# Patient Record
Sex: MALE | Race: WHITE | NOT HISPANIC OR LATINO | URBAN - METROPOLITAN AREA
[De-identification: names, ages, dates, MRNs, and addresses within clinical notes are randomized per-mention and may not be internally consistent; named-entity substitution may affect disease eponyms.]

---

## 2019-01-01 ENCOUNTER — EMERGENCY (EMERGENCY)
Facility: HOSPITAL | Age: 79
LOS: 1 days | End: 2019-01-01
Attending: EMERGENCY MEDICINE | Admitting: EMERGENCY MEDICINE
Payer: MEDICARE

## 2019-01-01 VITALS
DIASTOLIC BLOOD PRESSURE: 61 MMHG | OXYGEN SATURATION: 95 % | TEMPERATURE: 97 F | HEART RATE: 106 BPM | RESPIRATION RATE: 22 BRPM | SYSTOLIC BLOOD PRESSURE: 109 MMHG

## 2019-01-01 VITALS — SYSTOLIC BLOOD PRESSURE: 108 MMHG | HEART RATE: 88 BPM | OXYGEN SATURATION: 95 % | DIASTOLIC BLOOD PRESSURE: 70 MMHG

## 2019-01-01 DIAGNOSIS — I25.10 ATHEROSCLEROTIC HEART DISEASE OF NATIVE CORONARY ARTERY WITHOUT ANGINA PECTORIS: ICD-10-CM

## 2019-01-01 DIAGNOSIS — Z88.8 ALLERGY STATUS TO OTHER DRUGS, MEDICAMENTS AND BIOLOGICAL SUBSTANCES: ICD-10-CM

## 2019-01-01 DIAGNOSIS — I46.9 CARDIAC ARREST, CAUSE UNSPECIFIED: ICD-10-CM

## 2019-01-01 LAB
ALBUMIN SERPL ELPH-MCNC: 2.9 G/DL — LOW (ref 3.3–5)
ALBUMIN SERPL ELPH-MCNC: 3.1 G/DL — LOW (ref 3.3–5)
ALP SERPL-CCNC: 109 U/L — SIGNIFICANT CHANGE UP (ref 40–120)
ALP SERPL-CCNC: 128 U/L — HIGH (ref 40–120)
ALT FLD-CCNC: 37 U/L — SIGNIFICANT CHANGE UP (ref 10–45)
ALT FLD-CCNC: 43 U/L — SIGNIFICANT CHANGE UP (ref 10–45)
ANION GAP SERPL CALC-SCNC: 17 MMOL/L — SIGNIFICANT CHANGE UP (ref 5–17)
ANION GAP SERPL CALC-SCNC: 17 MMOL/L — SIGNIFICANT CHANGE UP (ref 5–17)
ANISOCYTOSIS BLD QL: SLIGHT — SIGNIFICANT CHANGE UP
APPEARANCE UR: CLEAR — SIGNIFICANT CHANGE UP
APTT BLD: 35.4 SEC — SIGNIFICANT CHANGE UP (ref 27.5–36.3)
AST SERPL-CCNC: 37 U/L — SIGNIFICANT CHANGE UP (ref 10–40)
AST SERPL-CCNC: 46 U/L — HIGH (ref 10–40)
BACTERIA # UR AUTO: PRESENT /HPF
BASE EXCESS BLDV CALC-SCNC: -2 MMOL/L — SIGNIFICANT CHANGE UP
BASE EXCESS BLDV CALC-SCNC: -2 MMOL/L — SIGNIFICANT CHANGE UP
BASE EXCESS BLDV CALC-SCNC: -6.9 MMOL/L — SIGNIFICANT CHANGE UP
BASOPHILS # BLD AUTO: 0.02 K/UL — SIGNIFICANT CHANGE UP (ref 0–0.2)
BASOPHILS # BLD AUTO: 0.08 K/UL — SIGNIFICANT CHANGE UP (ref 0–0.2)
BASOPHILS NFR BLD AUTO: 0.3 % — SIGNIFICANT CHANGE UP (ref 0–2)
BASOPHILS NFR BLD AUTO: 0.9 % — SIGNIFICANT CHANGE UP (ref 0–2)
BILIRUB SERPL-MCNC: 0.5 MG/DL — SIGNIFICANT CHANGE UP (ref 0.2–1.2)
BILIRUB SERPL-MCNC: 1 MG/DL — SIGNIFICANT CHANGE UP (ref 0.2–1.2)
BILIRUB UR-MCNC: NEGATIVE — SIGNIFICANT CHANGE UP
BLD GP AB SCN SERPL QL: NEGATIVE — SIGNIFICANT CHANGE UP
BUN SERPL-MCNC: 45 MG/DL — HIGH (ref 7–23)
BUN SERPL-MCNC: 47 MG/DL — HIGH (ref 7–23)
CA-I SERPL-SCNC: 1.11 MMOL/L — LOW (ref 1.12–1.3)
CALCIUM SERPL-MCNC: 8 MG/DL — LOW (ref 8.4–10.5)
CALCIUM SERPL-MCNC: 8.4 MG/DL — SIGNIFICANT CHANGE UP (ref 8.4–10.5)
CHLORIDE SERPL-SCNC: 103 MMOL/L — SIGNIFICANT CHANGE UP (ref 96–108)
CHLORIDE SERPL-SCNC: 99 MMOL/L — SIGNIFICANT CHANGE UP (ref 96–108)
CK MB CFR SERPL CALC: 12.7 NG/ML — HIGH (ref 0–6.7)
CK MB CFR SERPL CALC: 5.8 NG/ML — SIGNIFICANT CHANGE UP (ref 0–6.7)
CK SERPL-CCNC: 151 U/L — SIGNIFICANT CHANGE UP (ref 30–200)
CK SERPL-CCNC: 91 U/L — SIGNIFICANT CHANGE UP (ref 30–200)
CO2 SERPL-SCNC: 22 MMOL/L — SIGNIFICANT CHANGE UP (ref 22–31)
CO2 SERPL-SCNC: 27 MMOL/L — SIGNIFICANT CHANGE UP (ref 22–31)
COLOR SPEC: YELLOW — SIGNIFICANT CHANGE UP
CREAT SERPL-MCNC: 1.38 MG/DL — HIGH (ref 0.5–1.3)
CREAT SERPL-MCNC: 1.41 MG/DL — HIGH (ref 0.5–1.3)
DIFF PNL FLD: ABNORMAL
ELLIPTOCYTES BLD QL SMEAR: SLIGHT — SIGNIFICANT CHANGE UP
EOSINOPHIL # BLD AUTO: 0.02 K/UL — SIGNIFICANT CHANGE UP (ref 0–0.5)
EOSINOPHIL # BLD AUTO: 0.16 K/UL — SIGNIFICANT CHANGE UP (ref 0–0.5)
EOSINOPHIL NFR BLD AUTO: 0.3 % — SIGNIFICANT CHANGE UP (ref 0–6)
EOSINOPHIL NFR BLD AUTO: 1.7 % — SIGNIFICANT CHANGE UP (ref 0–6)
EPI CELLS # UR: SIGNIFICANT CHANGE UP /HPF (ref 0–5)
GAS PNL BLDV: 138 MMOL/L — SIGNIFICANT CHANGE UP (ref 138–146)
GAS PNL BLDV: SIGNIFICANT CHANGE UP
GIANT PLATELETS BLD QL SMEAR: PRESENT — SIGNIFICANT CHANGE UP
GLUCOSE SERPL-MCNC: 170 MG/DL — HIGH (ref 70–99)
GLUCOSE SERPL-MCNC: 195 MG/DL — HIGH (ref 70–99)
GLUCOSE UR QL: NEGATIVE — SIGNIFICANT CHANGE UP
HCO3 BLDV-SCNC: 23 MMOL/L — SIGNIFICANT CHANGE UP (ref 20–27)
HCO3 BLDV-SCNC: 24 MMOL/L — SIGNIFICANT CHANGE UP (ref 20–27)
HCO3 BLDV-SCNC: 25 MMOL/L — SIGNIFICANT CHANGE UP (ref 20–27)
HCT VFR BLD CALC: 27.4 % — LOW (ref 39–50)
HCT VFR BLD CALC: 33.8 % — LOW (ref 39–50)
HGB BLD-MCNC: 10.6 G/DL — LOW (ref 13–17)
HGB BLD-MCNC: 8.6 G/DL — LOW (ref 13–17)
HYALINE CASTS # UR AUTO: SIGNIFICANT CHANGE UP /LPF (ref 0–2)
IMM GRANULOCYTES NFR BLD AUTO: 1 % — SIGNIFICANT CHANGE UP (ref 0–1.5)
INR BLD: 2.19 — HIGH (ref 0.88–1.16)
KETONES UR-MCNC: NEGATIVE — SIGNIFICANT CHANGE UP
LACTATE SERPL-SCNC: 4.9 MMOL/L — CRITICAL HIGH (ref 0.5–2)
LACTATE SERPL-SCNC: 5.7 MMOL/L — CRITICAL HIGH (ref 0.5–2)
LEUKOCYTE ESTERASE UR-ACNC: NEGATIVE — SIGNIFICANT CHANGE UP
LYMPHOCYTES # BLD AUTO: 0.86 K/UL — LOW (ref 1–3.3)
LYMPHOCYTES # BLD AUTO: 10.8 % — LOW (ref 13–44)
LYMPHOCYTES # BLD AUTO: 4.33 K/UL — HIGH (ref 1–3.3)
LYMPHOCYTES # BLD AUTO: 46.1 % — HIGH (ref 13–44)
MACROCYTES BLD QL: SLIGHT — SIGNIFICANT CHANGE UP
MAGNESIUM SERPL-MCNC: 1.7 MG/DL — SIGNIFICANT CHANGE UP (ref 1.6–2.6)
MAGNESIUM SERPL-MCNC: 2 MG/DL — SIGNIFICANT CHANGE UP (ref 1.6–2.6)
MANUAL SMEAR VERIFICATION: SIGNIFICANT CHANGE UP
MCHC RBC-ENTMCNC: 30.1 PG — SIGNIFICANT CHANGE UP (ref 27–34)
MCHC RBC-ENTMCNC: 31.4 GM/DL — LOW (ref 32–36)
MCHC RBC-ENTMCNC: 31.4 GM/DL — LOW (ref 32–36)
MCHC RBC-ENTMCNC: 31.5 PG — SIGNIFICANT CHANGE UP (ref 27–34)
MCV RBC AUTO: 100.4 FL — HIGH (ref 80–100)
MCV RBC AUTO: 96 FL — SIGNIFICANT CHANGE UP (ref 80–100)
METAMYELOCYTES # FLD: 0.9 % — HIGH (ref 0–0)
MICROCYTES BLD QL: SLIGHT — SIGNIFICANT CHANGE UP
MONOCYTES # BLD AUTO: 0.31 K/UL — SIGNIFICANT CHANGE UP (ref 0–0.9)
MONOCYTES # BLD AUTO: 0.32 K/UL — SIGNIFICANT CHANGE UP (ref 0–0.9)
MONOCYTES NFR BLD AUTO: 3.4 % — SIGNIFICANT CHANGE UP (ref 2–14)
MONOCYTES NFR BLD AUTO: 3.9 % — SIGNIFICANT CHANGE UP (ref 2–14)
NEUTROPHILS # BLD AUTO: 4.41 K/UL — SIGNIFICANT CHANGE UP (ref 1.8–7.4)
NEUTROPHILS # BLD AUTO: 6.64 K/UL — SIGNIFICANT CHANGE UP (ref 1.8–7.4)
NEUTROPHILS NFR BLD AUTO: 41.9 % — LOW (ref 43–77)
NEUTROPHILS NFR BLD AUTO: 83.7 % — HIGH (ref 43–77)
NEUTS BAND # BLD: 5.1 % — SIGNIFICANT CHANGE UP (ref 0–8)
NITRITE UR-MCNC: NEGATIVE — SIGNIFICANT CHANGE UP
NRBC # BLD: 0 /100 WBCS — SIGNIFICANT CHANGE UP (ref 0–0)
OVALOCYTES BLD QL SMEAR: SLIGHT — SIGNIFICANT CHANGE UP
PCO2 BLDV: 50 MMHG — SIGNIFICANT CHANGE UP (ref 41–51)
PCO2 BLDV: 50 MMHG — SIGNIFICANT CHANGE UP (ref 41–51)
PCO2 BLDV: 68 MMHG — HIGH (ref 41–51)
PH BLDV: 7.14 — CRITICAL LOW (ref 7.32–7.43)
PH BLDV: 7.31 — LOW (ref 7.32–7.43)
PH BLDV: 7.31 — LOW (ref 7.32–7.43)
PH UR: 5.5 — SIGNIFICANT CHANGE UP (ref 5–8)
PHOSPHATE SERPL-MCNC: 5.2 MG/DL — HIGH (ref 2.5–4.5)
PLAT MORPH BLD: ABNORMAL
PLATELET # BLD AUTO: 106 K/UL — LOW (ref 150–400)
PLATELET # BLD AUTO: 126 K/UL — LOW (ref 150–400)
PO2 BLDV: 35 MMHG — SIGNIFICANT CHANGE UP
PO2 BLDV: 43 MMHG — SIGNIFICANT CHANGE UP
PO2 BLDV: 46 MMHG — SIGNIFICANT CHANGE UP
POIKILOCYTOSIS BLD QL AUTO: SLIGHT — SIGNIFICANT CHANGE UP
POTASSIUM BLDV-SCNC: 3.2 MMOL/L — LOW (ref 3.5–4.9)
POTASSIUM SERPL-MCNC: 3.4 MMOL/L — LOW (ref 3.5–5.3)
POTASSIUM SERPL-MCNC: 3.8 MMOL/L — SIGNIFICANT CHANGE UP (ref 3.5–5.3)
POTASSIUM SERPL-SCNC: 3.4 MMOL/L — LOW (ref 3.5–5.3)
POTASSIUM SERPL-SCNC: 3.8 MMOL/L — SIGNIFICANT CHANGE UP (ref 3.5–5.3)
PROT SERPL-MCNC: 5.4 G/DL — LOW (ref 6–8.3)
PROT SERPL-MCNC: 5.6 G/DL — LOW (ref 6–8.3)
PROT UR-MCNC: NEGATIVE MG/DL — SIGNIFICANT CHANGE UP
PROTHROM AB SERPL-ACNC: 25.4 SEC — HIGH (ref 10–12.9)
RBC # BLD: 2.73 M/UL — LOW (ref 4.2–5.8)
RBC # BLD: 3.52 M/UL — LOW (ref 4.2–5.8)
RBC # FLD: 22.9 % — HIGH (ref 10.3–14.5)
RBC # FLD: 23 % — HIGH (ref 10.3–14.5)
RBC BLD AUTO: ABNORMAL
RBC CASTS # UR COMP ASSIST: < 5 /HPF — SIGNIFICANT CHANGE UP
RH IG SCN BLD-IMP: POSITIVE — SIGNIFICANT CHANGE UP
RH IG SCN BLD-IMP: POSITIVE — SIGNIFICANT CHANGE UP
SAO2 % BLDV: 62 % — SIGNIFICANT CHANGE UP
SAO2 % BLDV: 62 % — SIGNIFICANT CHANGE UP
SAO2 % BLDV: 72 % — SIGNIFICANT CHANGE UP
SMUDGE CELLS # BLD: PRESENT — SIGNIFICANT CHANGE UP
SODIUM SERPL-SCNC: 142 MMOL/L — SIGNIFICANT CHANGE UP (ref 135–145)
SODIUM SERPL-SCNC: 143 MMOL/L — SIGNIFICANT CHANGE UP (ref 135–145)
SP GR SPEC: <=1.005 — SIGNIFICANT CHANGE UP (ref 1–1.03)
TROPONIN T SERPL-MCNC: 0.2 NG/ML — CRITICAL HIGH (ref 0–0.01)
TROPONIN T SERPL-MCNC: 0.29 NG/ML — CRITICAL HIGH (ref 0–0.01)
UROBILINOGEN FLD QL: 0.2 E.U./DL — SIGNIFICANT CHANGE UP
WBC # BLD: 7.93 K/UL — SIGNIFICANT CHANGE UP (ref 3.8–10.5)
WBC # BLD: 9.39 K/UL — SIGNIFICANT CHANGE UP (ref 3.8–10.5)
WBC # FLD AUTO: 7.93 K/UL — SIGNIFICANT CHANGE UP (ref 3.8–10.5)
WBC # FLD AUTO: 9.39 K/UL — SIGNIFICANT CHANGE UP (ref 3.8–10.5)
WBC UR QL: < 5 /HPF — SIGNIFICANT CHANGE UP

## 2019-01-01 PROCEDURE — 93306 TTE W/DOPPLER COMPLETE: CPT | Mod: 26

## 2019-01-01 PROCEDURE — 36556 INSERT NON-TUNNEL CV CATH: CPT

## 2019-01-01 PROCEDURE — 74018 RADEX ABDOMEN 1 VIEW: CPT | Mod: 26

## 2019-01-01 PROCEDURE — 43753 TX GASTRO INTUB W/ASP: CPT

## 2019-01-01 PROCEDURE — 71045 X-RAY EXAM CHEST 1 VIEW: CPT | Mod: 26,76

## 2019-01-01 PROCEDURE — 99285 EMERGENCY DEPT VISIT HI MDM: CPT | Mod: 25

## 2019-01-01 PROCEDURE — 99291 CRITICAL CARE FIRST HOUR: CPT | Mod: 25

## 2019-01-01 RX ORDER — PROTHROMBIN COMPLEX CONCENTRATE (HUMAN) 25.5; 16.5; 24; 22; 22; 26 [IU]/ML; [IU]/ML; [IU]/ML; [IU]/ML; [IU]/ML; [IU]/ML
3500 POWDER, FOR SOLUTION INTRAVENOUS ONCE
Qty: 0 | Refills: 0 | Status: COMPLETED | OUTPATIENT
Start: 2019-01-01 | End: 2019-01-01

## 2019-01-01 RX ORDER — PREGABALIN 225 MG/1
1 CAPSULE ORAL
Qty: 0 | Refills: 0 | COMMUNITY

## 2019-01-01 RX ORDER — PHENYLEPHRINE HYDROCHLORIDE 10 MG/ML
0.5 INJECTION INTRAVENOUS
Qty: 40 | Refills: 0 | Status: DISCONTINUED | OUTPATIENT
Start: 2019-01-01 | End: 2019-01-01

## 2019-01-01 RX ORDER — APIXABAN 2.5 MG/1
1 TABLET, FILM COATED ORAL
Qty: 0 | Refills: 0 | COMMUNITY

## 2019-01-01 RX ORDER — MIDODRINE HYDROCHLORIDE 2.5 MG/1
1 TABLET ORAL
Qty: 0 | Refills: 0 | COMMUNITY

## 2019-01-01 RX ORDER — FERROUS SULFATE 325(65) MG
1 TABLET ORAL
Qty: 0 | Refills: 0 | COMMUNITY

## 2019-01-01 RX ORDER — TAMSULOSIN HYDROCHLORIDE 0.4 MG/1
0 CAPSULE ORAL
Qty: 90 | Refills: 0 | COMMUNITY

## 2019-01-01 RX ORDER — PANTOPRAZOLE SODIUM 20 MG/1
80 TABLET, DELAYED RELEASE ORAL ONCE
Qty: 0 | Refills: 0 | Status: COMPLETED | OUTPATIENT
Start: 2019-01-01 | End: 2019-01-01

## 2019-01-01 RX ORDER — APIXABAN 2.5 MG/1
0 TABLET, FILM COATED ORAL
Qty: 60 | Refills: 0 | COMMUNITY

## 2019-01-01 RX ORDER — ATORVASTATIN CALCIUM 80 MG/1
0 TABLET, FILM COATED ORAL
Qty: 90 | Refills: 0 | COMMUNITY

## 2019-01-01 RX ORDER — TAMSULOSIN HYDROCHLORIDE 0.4 MG/1
1 CAPSULE ORAL
Qty: 0 | Refills: 0 | COMMUNITY

## 2019-01-01 RX ORDER — ATORVASTATIN CALCIUM 80 MG/1
1 TABLET, FILM COATED ORAL
Qty: 0 | Refills: 0 | COMMUNITY

## 2019-01-01 RX ORDER — DOBUTAMINE HCL 250MG/20ML
5 VIAL (ML) INTRAVENOUS
Qty: 500 | Refills: 0 | Status: DISCONTINUED | OUTPATIENT
Start: 2019-01-01 | End: 2019-01-01

## 2019-01-01 RX ORDER — MIDODRINE HYDROCHLORIDE 2.5 MG/1
0 TABLET ORAL
Qty: 90 | Refills: 0 | COMMUNITY

## 2019-01-01 RX ORDER — FUROSEMIDE 40 MG
40 TABLET ORAL ONCE
Qty: 0 | Refills: 0 | Status: COMPLETED | OUTPATIENT
Start: 2019-01-01 | End: 2019-01-01

## 2019-01-01 RX ORDER — PIPERACILLIN AND TAZOBACTAM 4; .5 G/20ML; G/20ML
3.38 INJECTION, POWDER, LYOPHILIZED, FOR SOLUTION INTRAVENOUS ONCE
Qty: 0 | Refills: 0 | Status: DISCONTINUED | OUTPATIENT
Start: 2019-01-01 | End: 2019-01-01

## 2019-01-01 RX ADMIN — Medication 40 MILLIGRAM(S): at 21:07

## 2019-01-01 RX ADMIN — PROTHROMBIN COMPLEX CONCENTRATE (HUMAN) 400 INTERNATIONAL UNIT(S): 25.5; 16.5; 24; 22; 22; 26 POWDER, FOR SOLUTION INTRAVENOUS at 18:54

## 2019-01-01 RX ADMIN — PANTOPRAZOLE SODIUM 80 MILLIGRAM(S): 20 TABLET, DELAYED RELEASE ORAL at 18:54

## 2019-03-10 NOTE — ED PROVIDER NOTE - OBJECTIVE STATEMENT
80 yo with sudden collapse at hotel w wife, EMS called and arrived and pt in PEA arrest for at least 10 min, 3 rounds of epi and CPR and intubated in field, as per EMS pt had blood in airway prior to intubation attempt.   Patient recently discharged from another hospital with new cardiac thrombosis diagnosis , currently taking Eliquis, known ho of CAD w 5 stents, CHF , afib, prior lower GI bleeds in past. As per wife pt was feeling well before collapse w no ill feelings or pain.

## 2019-03-10 NOTE — ED ADULT NURSE REASSESSMENT NOTE - NS ED NURSE REASSESS COMMENT FT1
Central line placement done by Dr. Sutton to right EJ. Pt given 3 units of PRBC and 1 platelet. Currently receiving 0.4 mcg/kg/hr of levophed. Breathing on ETT to vent. Davila inserted; clear yellow urine draining. Had BMx1; soft brown with blood tinge.

## 2019-03-10 NOTE — CONSULT NOTE ADULT - ASSESSMENT
78 yo M w/ history of CAD, s/p CABG, 5 PCI, A fib on xarelto, CHF who presents with PEA arrest    PEA arrest- with return of ROSC  -doubt 2/2 GIB( minimal blood brom NGT, hemoglobin at patient's baseline, rectal exam brown stool)  -given physical exam findings ( cold ext, pulm edema, reduced EF on bedside echo, likely he had coronary event)  -MICU consulted, cardiology consult pending  -continue with supportive care and further workup  -pt has received kcentra, PRBC, PPI  -please monitor hemoglobin and continue to monitor for signs of active GIB  -please notify GI if need for emergent endoscopy  obtain records from recent hospitalization    GI will follow. Case discussed with Dr. Mitchell

## 2019-03-10 NOTE — ED ADULT NURSE NOTE - NSIMPLEMENTINTERV_GEN_ALL_ED
Implemented All Fall with Harm Risk Interventions:  [place] to call system. Call bell, personal items and telephone within reach. Instruct patient to call for assistance. Room bathroom lighting operational. Non-slip footwear when patient is off stretcher. Physically safe environment: no spills, clutter or unnecessary equipment. Stretcher in lowest position, wheels locked, appropriate side rails in place. Provide visual cue, wrist band, yellow gown, etc. Monitor gait and stability. Monitor for mental status changes and reorient to person, place, and time. Review medications for side effects contributing to fall risk. Reinforce activity limits and safety measures with patient and family. Provide visual clues: red socks.

## 2019-03-10 NOTE — ED PROVIDER NOTE - CONSTITUTIONAL, MLM
normal... unconscious , spontaneous respirations with no voluntary movement of extremities , occasional biting on tube, fixed dilated pupils.

## 2019-03-10 NOTE — ED PROVIDER NOTE - CLINICAL SUMMARY MEDICAL DECISION MAKING FREE TEXT BOX
pt arrived ROSC after code in field for PEA arrest, pt with blood in mouth and on torso bright red on arrival, Initial treatment for suspected hemorrhagic shock , initially suspected GI bleed with mix of bright red and coffee ground from OG tube which was placed, however later bright blood increased from ET tubing and ventilator tubing, pt repetitively suctioned and continued bleeding from airway, needed high levels of pressors, initial Echo on arrival w mildly decreased EF however on repeat cardiac EF significantly declining,  afib w depressions,   HIGH suspician pt PEA'ed arrested due to hypoxia after spontaneous pulmonary hemorrhage secondary to eliquis. , also consider cardiac arrest with blood secondary to CPR trauma, Despite aggressive resuscitation efforts pt V fib arrested shortly after adding Dobutamine as an attempt to increase pt's contractility, code w epi multiple rounds bicarb x4 calcium amio several shocks , cardiac standstill confirmed by bedside echo and discussed w family.  Cardiology fellow and ICU attending also present at code and assisted.

## 2019-03-10 NOTE — ED ADULT TRIAGE NOTE - OTHER COMPLAINTS
pt s/p cardiac arrest. found unconscious while at a hotel this evening. 3 epi given, intubated in route by ems. pt presents with large amount of blood from mouth.

## 2019-03-10 NOTE — ED ADULT NURSE NOTE - NS ED NURSE TRANSPORT WITH
ACLS Rescue Kit/suction/ventilator/oxygen/Cardiac Monitor/Defib/ACLS/Rescue Kit/O2/BVM/IV pump/pulse ox

## 2019-03-10 NOTE — ED ADULT NURSE REASSESSMENT NOTE - NS ED NURSE REASSESS COMMENT FT1
pt received in resus room intubated  ETT to Vent. levophed @ .4mcg?kg, on cardiac monitor pt received in resus room intubated  ETT to Vent. levophed @ .4mcg/kg, on cardiac monitor bp 70/47 md aware levophed titrated to 2.2mcg/kg, with minimal improvement. epinephrine @ .5mch/kg initiated as per md order and dobutamine @ 5mcg/kg. bp increased to 112/63  then decreased to 81/57. 2115 pt noted to be in VFib as noted on the monitor. pacer pads already in place acls protocol initiated and maintained shock administered @2115,  high quality CPR initiated and maintained. epi 1mg ivp@2116,bicarb 1 amp @2116, bicarb 1 amp @2117, 2nd shock administered 2117, epi 1mg administered @2118, calcium chloride administered @ 2119, pulse check VFib  noted , shock administered @2119, CPR resumed, EPI 1mg @2121, [id number] administered @2121, 1 amp Bicarb administered @ 2121, 1 amp Bicarb administered @ 2122, epi 1mg administered @2124, amio 150 mg administered @2124, as acls protocol maintained. shock administered 2126 epi 1mg given @2127, mag 2 grams given @2127 epi 1mg given @ 2127. with no return of spontaneous circulation resus terminated @2129. post  mortem care done , wife and family at bedside. pt received in resus room intubated  ETT to Vent. levophed @ .4mcg/kg, on cardiac monitor bp 70/47 md aware levophed titrated to 2.2mcg/kg, with minimal improvement. epinephrine @0 .5mcg/kg initiated as per md order and dobutamine @ 5mcg/kg. bp increased to 112/63  then decreased to 81/57. 2115 pt noted to be in VFib as noted on the monitor. pacer pads already in place acls protocol initiated and maintained shock administered @2115,  high quality CPR initiated and maintained. epi 1mg ivp@2116,bicarb 1 amp @2116, bicarb 1 amp @2117, 2nd shock administered 2117, epi 1mg administered @2118, calcium chloride administered @ 2119, pulse check VFib  noted , shock administered @2119, CPR resumed, EPI 1mg @2121, [id number] administered @2121, 1 amp Bicarb administered @ 2121, 1 amp Bicarb administered @ 2122, epi 1mg administered @2124, amio 150 mg administered @2124, as acls protocol maintained. shock administered 2126 epi 1mg given @2127, mag 2 grams given @2127 epi 1mg given @ 2127. with no return of spontaneous circulation resus terminated @2129. post  mortem care done , wife and family at bedside.

## 2019-03-10 NOTE — CONSULT NOTE ADULT - SUBJECTIVE AND OBJECTIVE BOX
HPI:   78 yo M visiting from [facility] with extensive CAD history ( CABG, 5 stents), A fib who was found down in his hotel room today. Wife reports that he has been on and off of AC over the past few months because of lower GIBs. He came to [facility] this week to be evaluated for watchman procedure at [place]. He had a MARTHA on Monday and was found to have a LV thrombus and was thus restarted on AC. He was also started on diuretics at that time. Since the procedure, the wife reports that he had no complaints. Denied chest pain, sob, nausea, vomiting, hematemesis, hematochezia. He does not take NSAIDS and he does not drink.    Today, he collapsed in his hotel room. EMS came and coded patient, gave epi 3x, with ROSC. He was intubated in the field and dry blood was noted in his mouth. Upon arrival to the ER, NGT was placed and 70 cc bright red blood returned.     Allergies    Demerol (Unknown)    Intolerances      Home Medications:    MEDICATIONS:  MEDICATIONS  (STANDING):    MEDICATIONS  (PRN):    PAST MEDICAL & SURGICAL HISTORY:    FAMILY HISTORY:    SOCIAL HISTORY:  Tobacoo: denies  Alcohol: denies  Illicit Drugs:    REVIEW OF SYSTEMS:  CONSTITUTIONAL: No weakness, fevers or chills  HEENT: No visual changes; No vertigo or throat pain   NECK: No pain or stiffness  RESPIRATORY: No cough, wheezing, hemoptysis; No shortness of breath  CARDIOVASCULAR: No chest pain or palpitations  GASTROINTESTINAL: No abdominal or epigastric pain. No nausea, vomiting, or hematemesis; No diarrhea or constipation. No melena or hematochezia.  GENITOURINARY: No dysuria, frequency or hematuria  NEUROLOGICAL: No numbness or weakness  SKIN: No itching, burning, rashes, or lesions   All other 10 review of systems is negative unless indicated above.    Vital Signs Last 24 Hrs  T(C): --  T(F): --  HR: 53 (10 Mar 2019 19:01) (53 - 104)  BP: 104/70 (10 Mar 2019 19:01) (93/50 - 108/70)  BP(mean): --  RR: 55 (10 Mar 2019 19:01) (22 - 55)  SpO2: 92% (10 Mar 2019 19:01) (92% - 95%)      PHYSICAL EXAM:    General: Well developed; well nourished; in no acute distress  Eyes: Anicteric sclerae, moist conjunctivae  HENT: Moist mucous membranes  Neck: Trachea midline, supple  Lungs: Normal respiratory effors and no intercostal retractions  Cardiovascular: RRR  Abdomen: Soft, non-tender non-distended; Normal bowel sounds; No rebound or guarding  Extremities: Normal range of motion, No clubbing, cyanosis or edema  Neurological: Alert and oriented x3  Skin: Warm and dry. No obvious rash    LABS:                        8.6    9.39  )-----------( 126      ( 10 Mar 2019 18:26 )             27.4               PT/INR - ( 10 Mar 2019 18:26 )   PT: 25.4 sec;   INR: 2.19          PTT - ( 10 Mar 2019 18:26 )  PTT:35.4 sec    RADIOLOGY & ADDITIONAL STUDIES: HPI:     limited history provided by wife  78 yo M visiting from [place] w/ history of CAD, s/p CABG, 5 PCI, A fib on xarelto, CHF who presents S/P PEA arrest with ROSC.     Wife reports that he has been on and off of AC over the past few months because of lower GIBs, with colonoscopies done in [place] He came to [facility] this week to be evaluated for watchman procedure at [facility], where he was hospitalized from Monday to Friday. He had a MARTHA on Monday and was found to have a LV thrombus, and was thus restarted on AC. He had a cath on Tuesday, unsure of results.  He was also started on diuretics at that time for HF. Pt was discharged from [facility] two days ago and since discharge, the pt has been doing well with no complaints. Denied chest pain, sob, nausea, vomiting, hematemesis, hematochezia. He does not take NSAIDS and he does not drink.    Today, he collapsed in his hotel room while using the bathroom. Wife called 911. EMS came and pt was found to be in PEA Arrest. He was coded, received compressions, epi 3x, with ROSC. He was intubated in the. Upon arrival to the ER, NGT was placed and 70 cc bright red blood returned. Bedside echo shows reduced EF<10%.    Allergies    Demerol (Unknown)    Intolerances      Home Medications:  wife will provide list     MEDICATIONS:  MEDICATIONS  (STANDING):    MEDICATIONS  (PRN):    PAST MEDICAL & SURGICAL HISTORY:    FAMILY HISTORY:    SOCIAL HISTORY:  Tobacoo: denies  Alcohol: denies  Illicit Drugs:    REVIEW OF SYSTEMS:  unable to assess    Vital Signs Last 24 Hrs  T(C): --  T(F): --  HR: 53 (10 Mar 2019 19:01) (53 - 104)  BP: 104/70 (10 Mar 2019 19:01) (93/50 - 108/70)  BP(mean): --  RR: 55 (10 Mar 2019 19:01) (22 - 55)  SpO2: 92% (10 Mar 2019 19:01) (92% - 95%)      PHYSICAL EXAM:    General: intubated, unresponsive  Eyes: pupils reactive  HENT: dry blood in mouth, NGT with 70 cc bright red blood  Neck: Trachea midline, supple  Lungs: crackles  Cardiovascular: RRR  Abdomen: Soft, non-tender non-distended; Normal bowel sounds; No rebound or guarding  Extremities: cold  Rectal: brown stool    LABS:                        8.6    9.39  )-----------( 126      ( 10 Mar 2019 18:26 )             27.4               PT/INR - ( 10 Mar 2019 18:26 )   PT: 25.4 sec;   INR: 2.19          PTT - ( 10 Mar 2019 18:26 )  PTT:35.4 sec    RADIOLOGY & ADDITIONAL STUDIES:

## 2019-03-10 NOTE — CONSULT NOTE ADULT - ASSESSMENT
A/p  78 y/o M presenting to [facility] ED, s/p PEA arrest, rosc achieved, intubated in the field.  PMH sig for extensive cardiac Hx including CAD s/p CABG and multiple PCAs, last AI in July 2018 pt was on Plavix also Hx of a-fib however off AC as he had multiple lower GIB, HFrEF at 40%, recent hospitalization in [facility] discharged in 3/8  found to have a LA thrombus, so put back on Apixaban.  Impression:  based on exam and data, pt most likely in cardiogenic shock  given cold extremities with LE edema and lung exam with crackles, elevated troponin and diffuse ST depression in precordial leads  as Hg at baseline less likely hemorrhagic shock, pt also does not show symptoms of GIB had a massive non- melanotic BM in the ED, clinical picture not concerning for septic etiologies at present     Recommend:  - cardiology consult for dispo to CCU as pt requires diuresis and inotropic support    please reconsult MICU if further concerns or questions    Case discussed with Dr. Gonzalez. A/p  78 y/o M presenting to [facility] ED, s/p PEA arrest, rosc achieved, intubated in the field.  PMH sig for extensive cardiac Hx including CAD s/p CABG and multiple PCAs, last AI in July 2018 pt was on Plavix also Hx of a-fib however off AC as he had multiple lower GIB, HFrEF at 40%, recent hospitalization in [facility] discharged in 3/8  found to have a LA thrombus, so put back on Apixaban.  Impression:  based on exam and data, pt most likely in cardiogenic shock  given cold extremities with LE edema and lung exam with crackles, elevated troponin and diffuse ST depression in precordial leads and low Mixed venous O2 62  as Hg at baseline less likely hemorrhagic shock, pt also does not show symptoms of GIB had a massive non- melanotic BM in the ED, clinical picture not concerning for septic etiologies at present     Recommend:  - cardiology consult for dispo to CCU as pt requires diuresis and inotropic support in the setting of cardiogenic shock    please reconsult MICU if further concerns or questions    Case discussed with Dr. Gonzalez.

## 2019-03-10 NOTE — CONSULT NOTE ADULT - SUBJECTIVE AND OBJECTIVE BOX
HPI:  78 y/o M presenting to ED after he was found collapsed in his hotel room, found to be in PEA arrest by EMS, received epix3 and chest compression, ROSC achieved, pt intubated in the field and brought to [facility].  pt recently was admitted in [facility], for different medical problems, including A-fib, off apixaban in the setting of GIB and plan to place a Watchman procedure, however he was found to have an LA thrombus, put back on apixaban and discharged on Friday, his diuretic regimen also changed and he was put on torsemide.  his PMH is sig for HTN/HLD/ Afib, HFrEF (Ef ~ 40%) mod to severe MR, prostate CA s/p radiation, CAD s/p CABG and multiple subsequent PCI, last one in July 2018 ith AI to RPDA.  had also a repeat cardia cath per wife which showed patent grafts. also per wife he had a right sided thoracentesis for big effusion.  pt seen and examined in ED, intubated and vented, HR ranging from 50-90/ on 0.05 mcg of levo for bp support  comatose, PERRL, pt in distress, lung exam with diffuse crackles throughout, LE edema present BL up to the knees, also cold extremities  initial HG ~ 8.6 as per wife is pretty close to his baseline, however pt received 3 U of pRBC and kcentra, bedside echo showing diffuse severe hypokinesis of the heart.  also initially VBG remarkable for respiratory acidosis.  pt with some bloody secretion in ETT however small amount derek had BM, non melanotic abd bloody.  GI saw the pt, GIB unliky as a reason for the arrest and shock, asked ED staff to get cardiology on board as pt has trops 0.2 and ST depression in precordial leads.    PMH:  called to obtain records for [facility]  per wife  A-fib/ CHF/ HTN/BPH/prostate CA/ CAD s/p CABG and multiple PCIs/ ? amyloid hear    PSH:  CABG    Home meds:   to obtain records from [facility]    ROS: unable to obtain    .  VITAL SIGNS:  T(C): 35.9 (03-10-19 @ 19:47), Max: 35.9 (03-10-19 @ 19:47)  T(F): 96.6 (03-10-19 @ 19:47), Max: 96.6 (03-10-19 @ 19:47)  HR: 106 (03-10-19 @ 19:47) (53 - 106)  BP: 109/61 (03-10-19 @ 19:47) (93/50 - 109/61)  BP(mean): 77 (03-10-19 @ 19:47) (77 - 77)  RR: 22 (03-10-19 @ 19:47) (22 - 22)  SpO2: 95% (03-10-19 @ 19:47) (92% - 95%)  PHYSICAL EXAM:    Constitutional: intubated and vented  Eyes: PERRL  ENT: no nasal discharge; uvula midline, no oropharyngeal erythema or exudates; MMM  Neck: supple; no JVD +  Respiratory: diffuse bilateral crackles throughout   Cardia: on and off a-fib on the monitor   Gastrointestinal: soft, NT/ND; no rebound or guarding  Extremities: cold extremities, radial pulses faint  T/L/D: RIJ bleeding from insertion site and PIV c/d/i    .  LABS:                         8.6    9.39  )-----------( 126      ( 10 Mar 2019 18:26 )             27.4     03-10    143  |  99  |  47<H>  ----------------------------<  195<H>  3.4<L>   |  27  |  1.38<H>    Ca    8.4      10 Mar 2019 18:26  Phos  5.2     03-10  Mg     2.0     03-10    TPro  5.4<L>  /  Alb  2.9<L>  /  TBili  0.5  /  DBili  x   /  AST  37  /  ALT  37  /  AlkPhos  109  03-10    PT/INR - ( 10 Mar 2019 18:26 )   PT: 25.4 sec;   INR: 2.19          PTT - ( 10 Mar 2019 18:26 )  PTT:35.4 sec  Urinalysis Basic - ( 10 Mar 2019 18:28 )    Color: Yellow / Appearance: Clear / SG: <=1.005 / pH: x  Gluc: x / Ketone: NEGATIVE  / Bili: Negative / Urobili: 0.2 E.U./dL   Blood: x / Protein: NEGATIVE mg/dL / Nitrite: NEGATIVE   Leuk Esterase: NEGATIVE / RBC: < 5 /HPF / WBC < 5 /HPF   Sq Epi: x / Non Sq Epi: 0-5 /HPF / Bacteria: Present /HPF      CARDIAC MARKERS ( 10 Mar 2019 18:26 )  x     / 0.20 ng/mL / 91 U/L / x     / 5.8 ng/mL        Lactate, Blood: 5.7 mmoL/L (03-10 @ 18:27)      RADIOLOGY, EKG & ADDITIONAL TESTS: Reviewed.     ECG: diffuse ST depression in precordial leads  CXR: cardiomegaly present, bilateral Pleural effusion and pulmonary vascular congestion present

## 2019-03-10 NOTE — H&P ADULT - HISTORY OF PRESENT ILLNESS
Patient is a 79M w PMH of BPH, HTN, HLD, Afb (not on AC), CHrEF (EF 40% on TTE 7/18), prostateCA s/p radiation, mod-severe MR, CAD s/p CABG (SVG-dRCA, SVG-D1, SVG-Om1, LIMA-mLAD, MANGO-Ramus) with multiple subsequent PCIs with most recent AI placed in 7/18 to SVG-D1 and RPDA, off of anticoagulation due to 4 episodes of hematochezia Patient is a 79M w PMH of BPH, HTN, HLD, Afib, CHrEF (EF 40% on TTE 7/18), prostateCA s/p radiation, mod-severe MR, CAD s/p CABG (SVG-dRCA, SVG-D1, SVG-Om1, LIMA-mLAD, MANGO-Ramus) with multiple subsequent PCIs with most recent AI placed in 7/18 to SVG-D1 and RPDA, off of anticoagulation due to 4 episodes of hematochezia, recently admitted to [facility] on 3/4-3/9 for CHF exacerbation.

## 2019-03-10 NOTE — CONSULT NOTE ADULT - ATTENDING COMMENTS
pt with ho CAD and chronic systolic CH.. on eliquis despite previous GI bleed because he had  an intracardiac thrombus. he was at baseline function since release from [facility] as per wife with whom I discussed this, at bedside. he can walk apprx 2 blocvks outside, and did so on day of admission. he collapsed suddenly in the hotel room, no CPR received until EMS arrived, who found him in PEA and EMS found evidence of bleeding in airway as per ED staff. ROSC in hotel room with resusciation by EMS. he was treated with 3 PRBC and 2L NS in ED although no melena upon my inspection of the stool he had on arrival.  CXR showed evidence of pulm congestion. lactic acidosis and troponinemia post cardiac arrest. cardiology was consulted given the evident history of cad and chf and lack of evidence for GIB. Kcentra was given in view of evidence of bleeding, in that there was bloody ETT secretions, with no purulence.  subsequently he began bleeding copiously from ETT and suffered VF arrest. ACLS protocol was followed and ETT blood suctioned intermittently, total approx 300ml collected, with more lost on bed.  unfortunately his rhythm deteriorated to asystole despite acls protocol and bedside TTE by cardiology fellow showed cardiac standstill. he was pronounced dead. discussed with grieving wife and daughter. pt with ho CAD and chronic systolic CH.. on eliquis despite previous GI bleed because he had  an intracardiac thrombus. he was at baseline function since release from [facility] as per wife with whom I discussed this, at bedside. he can walk apprx 2 blocvks outside, and did so on day of admission. he collapsed suddenly in the hotel room, no CPR received until EMS arrived, who found him in PEA and EMS found evidence of bleeding in airway as per ED staff. ROSC in hotel room with resusciation by EMS. he was treated with 3 PRBC and 2L NS in ED although no melena upon my inspection of the stool he had on arrival.  CXR showed evidence of pulm congestion. lactic acidosis and troponinemia post cardiac arrest. cardiology was consulted given the evident history of cad and chf and lack of evidence for GIB. Kcentra was given in view of evidence of bleeding, in that there was bloody ETT secretions, with no purulence.  subsequently he began bleeding copiously from ETT and suffered VF arrest. ACLS protocol was followed and ETT blood suctioned intermittently, total approx 300ml collected, with more lost on bed.  unfortunately his rhythm deteriorated to asystole despite acls protocol and bedside TTE by cardiology fellow showed cardiac standstill. he was pronounced dead. discussed with grieving wife and daughter.  >35 min eval and managing life threatening conditions such as CHF, cardiac arrest, pulm hemorrhage.

## 2019-03-11 PROCEDURE — 85384 FIBRINOGEN ACTIVITY: CPT

## 2019-03-11 PROCEDURE — 86850 RBC ANTIBODY SCREEN: CPT

## 2019-03-11 PROCEDURE — 96374 THER/PROPH/DIAG INJ IV PUSH: CPT | Mod: XU

## 2019-03-11 PROCEDURE — 87086 URINE CULTURE/COLONY COUNT: CPT

## 2019-03-11 PROCEDURE — 83735 ASSAY OF MAGNESIUM: CPT

## 2019-03-11 PROCEDURE — 82550 ASSAY OF CK (CPK): CPT

## 2019-03-11 PROCEDURE — 84295 ASSAY OF SERUM SODIUM: CPT

## 2019-03-11 PROCEDURE — 84100 ASSAY OF PHOSPHORUS: CPT

## 2019-03-11 PROCEDURE — 71045 X-RAY EXAM CHEST 1 VIEW: CPT

## 2019-03-11 PROCEDURE — 36415 COLL VENOUS BLD VENIPUNCTURE: CPT

## 2019-03-11 PROCEDURE — 85730 THROMBOPLASTIN TIME PARTIAL: CPT

## 2019-03-11 PROCEDURE — 84132 ASSAY OF SERUM POTASSIUM: CPT

## 2019-03-11 PROCEDURE — 86901 BLOOD TYPING SEROLOGIC RH(D): CPT

## 2019-03-11 PROCEDURE — 83605 ASSAY OF LACTIC ACID: CPT

## 2019-03-11 PROCEDURE — P9016: CPT

## 2019-03-11 PROCEDURE — 86900 BLOOD TYPING SEROLOGIC ABO: CPT

## 2019-03-11 PROCEDURE — 96375 TX/PRO/DX INJ NEW DRUG ADDON: CPT | Mod: XU

## 2019-03-11 PROCEDURE — 36556 INSERT NON-TUNNEL CV CATH: CPT

## 2019-03-11 PROCEDURE — C1751: CPT

## 2019-03-11 PROCEDURE — 36430 TRANSFUSION BLD/BLD COMPNT: CPT | Mod: XU

## 2019-03-11 PROCEDURE — 93306 TTE W/DOPPLER COMPLETE: CPT

## 2019-03-11 PROCEDURE — 74018 RADEX ABDOMEN 1 VIEW: CPT

## 2019-03-11 PROCEDURE — 51702 INSERT TEMP BLADDER CATH: CPT | Mod: XU

## 2019-03-11 PROCEDURE — 81001 URINALYSIS AUTO W/SCOPE: CPT

## 2019-03-11 PROCEDURE — 85610 PROTHROMBIN TIME: CPT

## 2019-03-11 PROCEDURE — 99285 EMERGENCY DEPT VISIT HI MDM: CPT | Mod: 25

## 2019-03-11 PROCEDURE — 85025 COMPLETE CBC W/AUTO DIFF WBC: CPT

## 2019-03-11 PROCEDURE — 43753 TX GASTRO INTUB W/ASP: CPT

## 2019-03-11 PROCEDURE — 82962 GLUCOSE BLOOD TEST: CPT

## 2019-03-11 PROCEDURE — 86920 COMPATIBILITY TEST SPIN: CPT

## 2019-03-11 PROCEDURE — 82330 ASSAY OF CALCIUM: CPT

## 2019-03-11 PROCEDURE — P9035: CPT

## 2019-03-11 PROCEDURE — 82553 CREATINE MB FRACTION: CPT

## 2019-03-11 PROCEDURE — 80053 COMPREHEN METABOLIC PANEL: CPT

## 2019-03-11 PROCEDURE — 82803 BLOOD GASES ANY COMBINATION: CPT

## 2019-03-11 PROCEDURE — 84484 ASSAY OF TROPONIN QUANT: CPT

## 2019-03-12 LAB
CULTURE RESULTS: NO GROWTH — SIGNIFICANT CHANGE UP
SPECIMEN SOURCE: SIGNIFICANT CHANGE UP

## 2022-10-27 NOTE — ED ADULT TRIAGE NOTE - SOURCE OF INFORMATION
Spouse/EMS Minocycline Counseling: Patient advised regarding possible photosensitivity and discoloration of the teeth, skin, lips, tongue and gums.  Patient instructed to avoid sunlight, if possible.  When exposed to sunlight, patients should wear protective clothing, sunglasses, and sunscreen.  The patient was instructed to call the office immediately if the following severe adverse effects occur:  hearing changes, easy bruising/bleeding, severe headache, or vision changes.  The patient verbalized understanding of the proper use and possible adverse effects of minocycline.  All of the patient's questions and concerns were addressed.

## 2025-06-24 NOTE — ED ADULT NURSE NOTE - NSFALLRSKHRMRISKTYPE_ED_ALL_ED
[Follow-Up: _____] : a [unfilled] follow-up visit
coagulation(Bleeding disorder R/T clinical cond/anti-coags)
